# Patient Record
Sex: MALE | Race: BLACK OR AFRICAN AMERICAN
[De-identification: names, ages, dates, MRNs, and addresses within clinical notes are randomized per-mention and may not be internally consistent; named-entity substitution may affect disease eponyms.]

---

## 2017-03-13 NOTE — ED PHYSICIAN CHART
Chief Complaint/HPI





- Patient Information


Date Seen:: 03/13/17


Time Seen:: 15:05


Chief Complaint:: anxiety


History of Present Illness:: 





36-year-old male complains of acute, constant, moderate, generalized anxiety 

since this morning after he ran out of his Xanax.  Typically uses Xanax to 

control his anxiety.


Historian:: Patient


Review:: Nurse's Note Reviewed





Review of Systems





- Review of Systems


Other: Complete system review otherwise unremarkable except as noted in HPI.





Past Medical History





- Past Medical History


Past Medical History: Other (anxiety)





Family Medical History





- Family Member


  ** Grandmother


Hx Family Diabetes: Yes





Physical Exam





- Physical Examination


Other:: 





INITIAL VITAL SIGNS: Reviewed by me


GENERAL: Alert and interactive. No acute distress


HEAD: Head is normocephalic and atraumatic


EYES: EOMI. . No scleral icterus. No conjunctival injection


ENT: Moist mucous membranes. 


NECK: Supple. No masses. Full range of motion


RESPIRATORY: No tachypnea. Clear breath sounds bilaterally. No wheezing, rales, 

or rhonchi


CV: Regular rate and rhythm. No murmurs, rubs, or gallops


ABDOMEN: Soft, non-distended, non-tender. No guarding. No rebound. No masses. 


EXTREMITIES: No deformity. No cyanosis. No edema.  Left upper extremity and long

-arm cast.  Good neurovascular status to the distal extremity.


SKIN: Warm and dry. No obvious rashes. 


NEUROLOGIC: Alert and oriented. Face is symmetric. Speech is normal. Moves all 

extremities equally. Motor and sensory distally intact. 





ED Septic Shock





- .


Is Septic Shock (SBP<90, OR Lactate>4 mmol\L) present?: No





Reassessment (Disposition)





- Reassessment


Reassessment:: 





The patient's blood pressure was elevated (>120/80) but appears stable without 

evidence of hypertensive emergency or urgency.  The patient was counseled about 

the risks hypertension urged to pursue outpatient monitoring and therapy within 

a week with her primary care physician.





Patient was researched on the [employer] database.  Last 6 months of activity show 

47 controlled substance prescriptions were filled including both narcotics and 

benzodiazepines.  Most recently he filled a prescription for alprazolam on 

March 2, 2017.  He does not seem to be using anyone provider in particular 

however looks as though he's shopping to different emergency departments and/or 

urgent care is abilities for his prescriptions.  I discussed this with the 

patient who originally claimed that he was not taking any narcotics.  As a 

courtesy we will treat the patient here for his anxiety however we are unable 

to provide any prescriptions given the high likelihood that he appears to be 

abusing both narcotics and benzodiazepines.  Physical exam is essentially 

unremarkable for signs of anxiety.  I've asked him to follow up with a primary 

care physician within one to 2 days.  Also given him return to ER precautions.  

Patient says he understands and agrees with the plan.


Reassessment Condition:: Improved





- Diagnosis


Diagnosis:: 





Anxiety, acute on chronic


Elevated blood pressure without diagnosis of hypertension





- Aftercare/Follow up Instructions


Aftercare/Follow-Up Instructions:: Counseled pt regarding lab results/diagnosis 

& need follow up, Refer to Discharge Instructions





- Patient Disposition


Discharge/Transfer:: Home


Time:: 15:21


Condition at Disposition:: Improved





ED Discharge Plan





- Patient Disposition


Admit/Discharge/Transfer: PT DISCHARGED HOME


Condition at Disposition: Improved


Instructions:  Anxiety and Panic Attacks, Easy-to-Read

## 2018-03-28 ENCOUNTER — HOSPITAL ENCOUNTER (EMERGENCY)
Dept: HOSPITAL 87 - ER | Age: 38
LOS: 1 days | Discharge: LEFT BEFORE DISCHARGE | End: 2018-03-29
Payer: COMMERCIAL

## 2018-03-28 VITALS — BODY MASS INDEX: 36.73 KG/M2 | WEIGHT: 271.17 LBS | HEIGHT: 72 IN

## 2018-03-28 VITALS — SYSTOLIC BLOOD PRESSURE: 161 MMHG | DIASTOLIC BLOOD PRESSURE: 101 MMHG

## 2018-03-28 DIAGNOSIS — Z53.21: ICD-10-CM

## 2018-03-28 DIAGNOSIS — F41.9: Primary | ICD-10-CM

## 2018-05-15 ENCOUNTER — HOSPITAL ENCOUNTER (EMERGENCY)
Dept: HOSPITAL 36 - ER | Age: 38
LOS: 1 days | Discharge: HOME | End: 2018-05-16
Payer: MEDICAID

## 2018-05-15 DIAGNOSIS — F41.8: Primary | ICD-10-CM

## 2018-05-15 DIAGNOSIS — Z88.8: ICD-10-CM

## 2018-05-15 DIAGNOSIS — F19.10: ICD-10-CM

## 2018-05-15 PROCEDURE — Z7502: HCPCS

## 2018-05-15 NOTE — ED PHYSICIAN CHART
ED Chief Complaint/HPI





- Patient Information


Date Seen:: 05/15/18


Time Seen:: 23:13


Chief Complaint:: Depression and anxiety


History of Present Illness:: 


38 yo homeless male walked to ER stating that he was feeling anxious because he 

ran out of Xanax. He will see his psychiatrist in one week. He had been taking 

Xanax 2mg tid and celexa 20mg qhs for his anxiety and depression. He also had 

diarrhea for 1 day.


Allergies:: 


 Allergies











Allergy/AdvReac Type Severity Reaction Status Date / Time


 


aripiprazole [From Abilify] Allergy   Verified 03/13/17 15:13


 


haloperidol [From Haldol] Allergy   Verified 03/13/17 15:13














ED Review of Systems





- Review of Systems


General/Constitutional: No fever


Skin: No skin lesions


Head: No headache


Eyes: No pain


ENT: No nasal drainage


Neck: No neck pain


Cardio Vascular: No chest pain


Pulmonary: No SOB


GI: No nausea, No vomiting


Musculoskeletal: No bone or joint pain


Psychiatric: Depression, Anxiety


Neurological: No focal symptoms





ED Past Medical History





- Past Medical History


Past Medical History: No significant medical hx


Social History: Non Smoker, No Alcohol, No Drug Use


Surgical History: other (right thumb surgery, bone graft on right hip, right 

radius fracture)


Psychiatricy History: Depression, Other (anxiety)





Family Medical History





- Family Member


  ** Grandmother


Hx Family Diabetes: Yes





ED Physical Exam





- Physical Examination


General/Constitutional: Awake


Head: Atraumatic


Eyes: PERRL


Skin: No skin lesions


ENMT: Nasal exam nl


Neck: No nuchal rigidity


Respiratory: No Wheeze/Rhonchi/Rales


Cardio Vascular: RRR, No murmur, gallop, rubs, NL S1 S2


GI: No tenderness/rebounding/guarding


Extremities: normal strength in all extremities


Neuro/Psych: No focal deficits





ED Labs/Radiology/EKG Results





- Lab Results


Results: 





 Laboratory Last Values











Urine Source  RANDOM   05/16/18  05:30    


 


Urine Color  YELLOW   05/16/18  05:30    


 


Urine Clarity  CLEAR  (CLEAR)   05/16/18  05:30    


 


Urine pH  6.0  (4.6 - 8.0)   05/16/18  05:30    


 


Ur Specific Gravity  1.025  (1.005-1.030)   05/16/18  05:30    


 


Urine Protein  TRACE mg/dL (NEGATIVE)   05/16/18  05:30    


 


Urine Glucose (UA)  NEGATIVE mg/dL (NEGATIVE)   05/16/18  05:30    


 


Urine Ketones  NEGATIVE mg/dL (NEGATIVE)   05/16/18  05:30    


 


Urine Blood  NEGATIVE  (NEGATIVE)   05/16/18  05:30    


 


Urine Nitrate  NEGATIVE  (NEGATIVE)   05/16/18  05:30    


 


Urine Bilirubin  NEGATIVE  (NEGATIVE)   05/16/18  05:30    


 


Urine Urobilinogen  0.2 E.U./dL (0.2 - 1.0)   05/16/18  05:30    


 


Ur Leukocyte Esterase  NEGATIVE  (NEGATIVE)   05/16/18  05:30    


 


Urine RBC  0-2 /hpf (0-5)  H  05/16/18  05:30    


 


Urine WBC  0-2 /hpf (0-5)   05/16/18  05:30    


 


Ur Epithelial Cells  FEW /lpf (FEW)   05/16/18  05:30    


 


Urine Bacteria  OCCASIONAL /hpf (NONE SEEN)   05/16/18  05:30    


 


Urine Mucus  FEW /lpf (FEW)   05/16/18  05:30    


 


Urine Opiates Screen  NEGATIVE  (NEGATIVE)   05/16/18  05:30    


 


Urine Methadone Screen  NEGATIVE  (NEGATIVE)   05/16/18  05:30    


 


Ur Barbiturates Screen  NEGATIVE  (NEGATIVE)   05/16/18  05:30    


 


Ur Tricyclics Screen  NEGATIVE  (NEGATIVE)   05/16/18  05:30    


 


Ur Phencyclidine Scrn  NEGATIVE  (NEGATIVE)   05/16/18  05:30    


 


Amphetamines Screen  NEGATIVE  (NEGATIVE)   05/16/18  05:30    


 


U Methamphetamines Scrn  NEGATIVE  (NEGATIVE)   05/16/18  05:30    


 


U Benzodiazepines Scrn  POSITIVE  (NEGATIVE)  H  05/16/18  05:30    


 


U Cocaine Metab Screen  NEGATIVE  (NEGATIVE)   05/16/18  05:30    


 


U Cannabinoids Screen  POSITIVE  (NEGATIVE)  H  05/16/18  05:30    














ED Assessment





- Assessment


General Assessment: 


Anxiety


Substance abuse


Assessment/Comments:: 


UA, urine drug screen


Patient became calmer after slept the whole night in the ER


CURES search showed patient recently had different benzodiazepines with 

different dosages from different providers


D/c home





ED Septic Shock





- .


Is Septic Shock (SBP<90, OR Lactate>4 mmol\L) present?: No





ED Reassessment (Disposition)





- Reassessment


Reassessment Condition:: Improved





- Patient Disposition


Discharge/Transfer:: Home





ED Discharge Plan





- Patient Disposition


Admit/Discharge/Transfer: PT DISCHARGED HOME


Instructions:  Anxiety and Panic Attacks, Easy-to-Read


Additional Instructions: 


follow up with your doctor next week as discussed.

## 2018-05-16 LAB
AMPHET UR-MCNC: NEGATIVE NG/ML
APPEARANCE UR: CLEAR
BACTERIA #/AREA URNS HPF: (no result) /HPF
BARBITURATES UR-MCNC: NEGATIVE UG/ML
BENZODIAZEPINES PNL UR: POSITIVE
BILIRUB UR-MCNC: NEGATIVE MG/DL
CANNABINOIDS SERPL QL CFM: POSITIVE
COCAINE METAB.OTHER UR-MCNC: NEGATIVE NG/ML
COLOR UR: YELLOW
EPI CELLS URNS QL MICRO: (no result) /LPF
GLUCOSE UR STRIP-MCNC: NEGATIVE MG/DL
KETONES UR STRIP-MCNC: NEGATIVE MG/DL
LEUKOCYTE ESTERASE UR-ACNC: NEGATIVE
METHADONE UR CFM-MCNC: NEGATIVE NG/ML
METHAMPHET UR QL: NEGATIVE
MICRO URNS: YES
NITRITE UR QL STRIP: NEGATIVE
OPIATES UR QL: NEGATIVE
PCP UR-MCNC: NEGATIVE UG/L
PH UR STRIP: 6 [PH] (ref 4.6–8)
PROT UR STRIP-MCNC: (no result) MG/DL
RBC # UR STRIP: NEGATIVE /UL
RBC #/AREA URNS HPF: (no result) /HPF (ref 0–5)
SP GR UR STRIP: 1.02 (ref 1–1.03)
TRICYCLICS UR QL: NEGATIVE
URINALYSIS COMPLETE PNL UR: (no result)
UROBILINOGEN UR STRIP-ACNC: 0.2 E.U./DL (ref 0.2–1)
WBC #/AREA URNS HPF: (no result) /HPF (ref 0–5)

## 2018-05-26 ENCOUNTER — HOSPITAL ENCOUNTER (EMERGENCY)
Dept: HOSPITAL 72 - EMR | Age: 38
Discharge: HOME | End: 2018-05-26
Payer: MEDICAID

## 2018-05-26 VITALS — WEIGHT: 260 LBS | BODY MASS INDEX: 35.21 KG/M2 | HEIGHT: 72 IN

## 2018-05-26 VITALS — SYSTOLIC BLOOD PRESSURE: 112 MMHG | DIASTOLIC BLOOD PRESSURE: 77 MMHG

## 2018-05-26 DIAGNOSIS — Z76.5: Primary | ICD-10-CM

## 2018-05-26 DIAGNOSIS — F13.10: ICD-10-CM

## 2018-05-26 PROCEDURE — 99282 EMERGENCY DEPT VISIT SF MDM: CPT

## 2018-05-26 NOTE — EMERGENCY ROOM REPORT
History of Present Illness


General


Chief Complaint:  Medication Refill


Source:  Patient





Present Illness


HPI


37 y.o. M with hx of anxiety, here requesting refill of xanax. pt reports he 

has been taking xanax for 2 yrs, prescribed by his PA, taking 3-4 and sometimes 

5 pills per day. denies SI, HI, changes of appetite. c/o anxiety, but denies 

palpitation, sob, cp, dizziness, vision changes. last took xanax yesterdy 

reports "nothing else works." 





cures was done and he had collected 21 xanax on May 16 2018, and 15 xanax on 

May 17 2018.


Allergies:  


Coded Allergies:  


     No Known Allergies (Unverified , 5/26/18)





Patient History


Past Medical History:  see triage record


Past Surgical History:  unable to obtain


Pertinent Family History:  unable to obtain


Social History:  Reports: drug use


Reviewed Nursing Documentation:  PMH: Agreed; PSxH: Agreed





Nursing Documentation-PMH


Past Medical History:  No History, Except For


Hx Cardiac Problems:  No


Hx Hypertension:  No


Hx Pacemaker:  No


Hx Asthma:  No


Hx COPD:  No


Hx Diabetes:  No


Hx Cancer:  No


Hx Gastrointestinal Problems:  No


Hx Dialysis:  No


History Of Psychiatric Problem:  Yes - Anxiety


Hx Neurological Problems:  No


Hx Cerebrovascular Accident:  No


Hx Seizures:  No





Review of Systems


All Other Systems:  negative except mentioned in HPI





Physical Exam





Vital Signs








  Date Time  Temp Pulse Resp B/P (MAP) Pulse Ox O2 Delivery O2 Flow Rate FiO2


 


5/26/18 18:15 98.1 100 18 112/77 99 Room Air  





 98.1       








Sp02 EP Interpretation:  reviewed


General Appearance:  normal inspection, well appearing, alert, GCS 15, non-toxic


Head:  normocephalic


Eyes:  bilateral eye normal inspection, bilateral eye PERRL


ENT:  normal ENT inspection


Neck:  normal inspection, full range of motion


Respiratory:  normal inspection, chest non-tender, lungs clear, no rhonchi, no 

retraction, no accessory muscle use, no wheezing


Cardiovascular #1:  normal inspection, normal peripheral pulses, regular rate, 

rhythm, no edema, no gallop, no JVD, no murmur, normal capillary refill


Gastrointestinal:  normal inspection, normal bowel sounds, non tender


Rectal:  deferred


Genitourinary:  deferred


Musculoskeletal:  normal inspection


Neurologic:  normal inspection, alert, oriented x3, responsive


Psychiatric:  anxious - drug seeking behavior


Skin:  normal inspection, normal color, no rash, warm/dry


Lymphatic:  normal inspection, no adenopathy





Medical Decision Making


PA Attestation


all discussions, dx, PE, orders, tx were reviewed and approved by my 

supervising physician Dr. Porter


Reaction to Intervention:  No change


Diagnostic Impression:  


 Primary Impression:  


 Drug-seeking behavior


 Additional Impression:  


 Benzodiazepine abuse


ER Course


37 y.o. M with hx of anxiety, here requesting refill of xanax. pt reports he 

has been taking xanax for 2 yrs, prescribed by his PA, taking 3-4 and sometimes 

5 pills per day. denies SI, HI, changes of appetite. c/o anxiety, but denies 

palpitation, sob, cp, dizziness, vision changes. last took xanax yesterdy 

reports "nothing else works." 





cures was done and he had collected 21 xanax on May 16 2018, and 15 xanax on 

May 17 2018. 








Ddx considered but are not limited to drug seeking behavior, bezodiazepam 

withdrawal





Vital signs: are WNL, pt. is afebrile





H&PE are most consistent with drug seeking behavior 





ORDERS: none required at this time, the diagnosis is clinical





ED INTERVENTIONS: None required at this time. pt refused EKG, assessment for 

over use of benzo once was told he will not be given any refills on xanax








DISCHARGE: At this time pt. is stable for d/c to home. Will provide printed 

patient care instructions, and any necessary prescriptions. Care plan and 

follow up instructions have been discussed with the patient prior to discharge.





Last Vital Signs








  Date Time  Temp Pulse Resp B/P (MAP) Pulse Ox O2 Delivery O2 Flow Rate FiO2


 


5/26/18 18:34 98.1 100 18 112/77 99 Room Air  





 98.1       








Status:  unchanged


Disposition:  HOME, SELF-CARE


Condition:  Stable





Additional Instructions:  


pt walked out once found out no refills will be given on Xanax. pt was ofered 

proranolol but declined. at time to dc, pt was asked multiple times if he has SI

/HI and he denied both. 


pt to f/u with pcp for referral to psych for better mgmt of anxiety and benzo 

is not the daily tx of anxiety











Blade Calderon May 26, 2018 18:46

## 2018-09-28 ENCOUNTER — HOSPITAL ENCOUNTER (EMERGENCY)
Dept: HOSPITAL 87 - ER | Age: 38
LOS: 2 days | Discharge: HOME | End: 2018-09-30
Payer: MEDICAID

## 2018-09-28 VITALS — HEIGHT: 73 IN | BODY MASS INDEX: 35.94 KG/M2 | WEIGHT: 271.17 LBS

## 2018-09-28 DIAGNOSIS — F17.200: ICD-10-CM

## 2018-09-28 DIAGNOSIS — F41.9: ICD-10-CM

## 2018-09-28 DIAGNOSIS — F31.9: ICD-10-CM

## 2018-09-28 DIAGNOSIS — F15.10: ICD-10-CM

## 2018-09-28 DIAGNOSIS — R45.851: Primary | ICD-10-CM

## 2018-09-28 DIAGNOSIS — F12.10: ICD-10-CM

## 2018-09-28 DIAGNOSIS — R45.850: ICD-10-CM

## 2018-09-28 PROCEDURE — 96372 THER/PROPH/DIAG INJ SC/IM: CPT

## 2018-09-28 PROCEDURE — 36415 COLL VENOUS BLD VENIPUNCTURE: CPT

## 2018-09-28 PROCEDURE — 80329 ANALGESICS NON-OPIOID 1 OR 2: CPT

## 2018-09-28 PROCEDURE — 85025 COMPLETE CBC W/AUTO DIFF WBC: CPT

## 2018-09-28 PROCEDURE — 80307 DRUG TEST PRSMV CHEM ANLYZR: CPT

## 2018-09-28 PROCEDURE — 87086 URINE CULTURE/COLONY COUNT: CPT

## 2018-09-28 PROCEDURE — 99284 EMERGENCY DEPT VISIT MOD MDM: CPT

## 2018-09-28 PROCEDURE — 80048 BASIC METABOLIC PNL TOTAL CA: CPT

## 2018-09-28 PROCEDURE — 80305 DRUG TEST PRSMV DIR OPT OBS: CPT

## 2018-09-28 PROCEDURE — 81003 URINALYSIS AUTO W/O SCOPE: CPT

## 2018-09-29 LAB
AMPHETAMINES UR QL SCN: (no result)
APPEARANCE UR: CLEAR
BARBITURATES UR QL SCN: NEGATIVE
BASOPHILS NFR BLD AUTO: 1.1 % (ref 0–2)
BENZODIAZ UR QL SCN: (no result)
BZE UR QL SCN: NEGATIVE
CANNABINOIDS UR QL SCN: (no result)
CHLORIDE SERPL-SCNC: 105 MEQ/L (ref 98–107)
COLOR UR: YELLOW
EOSINOPHIL NFR BLD AUTO: 2.1 % (ref 0–5)
ERYTHROCYTE [DISTWIDTH] IN BLOOD BY AUTOMATED COUNT: 14.5 % (ref 11.6–14.6)
ETHANOL SERPL-MCNC: < 10 MG/DL
HCT VFR BLD AUTO: 43.4 % (ref 42–52)
HGB BLD-MCNC: 14.6 G/DL (ref 14–18)
HGB UR QL STRIP: NEGATIVE
KETONES UR STRIP-MCNC: (no result) MG/DL
LEUKOCYTE ESTERASE UR QL STRIP: NEGATIVE
LYMPHOCYTES NFR BLD AUTO: 34.8 % (ref 20–50)
MCH RBC QN AUTO: 26.8 PG (ref 28–32)
MCV RBC AUTO: 79.4 FL (ref 80–94)
METHADONE UR QL SCN: NEGATIVE
MONOCYTES NFR BLD AUTO: 7.1 % (ref 2–8)
NEUTROPHILS NFR BLD AUTO: 54.9 % (ref 40–76)
NITRITE UR QL STRIP: NEGATIVE
OPIATES UR QL SCN: NEGATIVE
PCP UR QL SCN: NEGATIVE
PH UR STRIP: 5.5 [PH] (ref 4.5–8)
PLATELET # BLD AUTO: 309 X1000/UL (ref 130–400)
PMV BLD AUTO: 7.8 FL (ref 7.4–10.4)
PROT UR QL STRIP: NEGATIVE
RBC # BLD AUTO: 5.46 MILL/UL (ref 4.7–6.1)
SP GR UR STRIP: 1.03 (ref 1–1.03)
UROBILINOGEN UR STRIP-MCNC: 0.2 E.U./DL (ref 0.2–1)

## 2018-09-30 VITALS — DIASTOLIC BLOOD PRESSURE: 74 MMHG | SYSTOLIC BLOOD PRESSURE: 118 MMHG
